# Patient Record
Sex: FEMALE | Race: WHITE | NOT HISPANIC OR LATINO | Employment: OTHER | URBAN - METROPOLITAN AREA
[De-identification: names, ages, dates, MRNs, and addresses within clinical notes are randomized per-mention and may not be internally consistent; named-entity substitution may affect disease eponyms.]

---

## 2017-01-12 ENCOUNTER — COMMUNICATION - HEALTHEAST (OUTPATIENT)
Dept: FAMILY MEDICINE | Facility: CLINIC | Age: 52
End: 2017-01-12

## 2017-01-23 ENCOUNTER — OFFICE VISIT - HEALTHEAST (OUTPATIENT)
Dept: FAMILY MEDICINE | Facility: CLINIC | Age: 52
End: 2017-01-23

## 2017-01-23 ENCOUNTER — COMMUNICATION - HEALTHEAST (OUTPATIENT)
Dept: TELEHEALTH | Facility: CLINIC | Age: 52
End: 2017-01-23

## 2017-01-23 DIAGNOSIS — Z82.49 FAMILY HISTORY OF HEART DISEASE: ICD-10-CM

## 2017-01-23 DIAGNOSIS — E78.5 HYPERLIPIDEMIA, UNSPECIFIED HYPERLIPIDEMIA TYPE: ICD-10-CM

## 2017-01-23 DIAGNOSIS — F40.240 CLAUSTROPHOBIA: ICD-10-CM

## 2017-01-23 DIAGNOSIS — Z12.4 SCREENING FOR CERVICAL CANCER: ICD-10-CM

## 2017-01-23 DIAGNOSIS — Z00.00 ROUTINE GENERAL MEDICAL EXAMINATION AT A HEALTH CARE FACILITY: ICD-10-CM

## 2017-01-23 DIAGNOSIS — G43.909 MIGRAINE HEADACHE: ICD-10-CM

## 2017-01-23 LAB
CHOLEST SERPL-MCNC: 265 MG/DL
FASTING STATUS PATIENT QL REPORTED: YES
HDLC SERPL-MCNC: 70 MG/DL
LDLC SERPL CALC-MCNC: 173 MG/DL
TRIGL SERPL-MCNC: 110 MG/DL

## 2017-01-23 ASSESSMENT — MIFFLIN-ST. JEOR: SCORE: 1214.6

## 2017-01-26 LAB
HPV INTERPRETATION - HISTORICAL: NORMAL
HPV INTERPRETER - HISTORICAL: NORMAL

## 2017-01-31 ENCOUNTER — COMMUNICATION - HEALTHEAST (OUTPATIENT)
Dept: FAMILY MEDICINE | Facility: CLINIC | Age: 52
End: 2017-01-31

## 2017-01-31 DIAGNOSIS — E78.5 HYPERLIPIDEMIA: ICD-10-CM

## 2017-01-31 DIAGNOSIS — R07.89 CHEST TIGHTNESS: ICD-10-CM

## 2017-02-03 ENCOUNTER — COMMUNICATION - HEALTHEAST (OUTPATIENT)
Dept: FAMILY MEDICINE | Facility: CLINIC | Age: 52
End: 2017-02-03

## 2017-02-15 ENCOUNTER — HOSPITAL ENCOUNTER (OUTPATIENT)
Dept: CARDIOLOGY | Facility: CLINIC | Age: 52
Discharge: HOME | End: 2017-02-15
Attending: FAMILY MEDICINE

## 2017-02-15 ENCOUNTER — HOSPITAL ENCOUNTER (OUTPATIENT)
Dept: MAMMOGRAPHY | Facility: CLINIC | Age: 52
Discharge: HOME | End: 2017-02-15
Attending: FAMILY MEDICINE

## 2017-02-15 DIAGNOSIS — R07.89 CHEST TIGHTNESS: ICD-10-CM

## 2017-02-15 DIAGNOSIS — Z12.31 VISIT FOR SCREENING MAMMOGRAM: ICD-10-CM

## 2017-02-15 LAB
CV STRESS CURRENT BP HE: NORMAL
CV STRESS CURRENT HR HE: 100
CV STRESS CURRENT HR HE: 101
CV STRESS CURRENT HR HE: 103
CV STRESS CURRENT HR HE: 103
CV STRESS CURRENT HR HE: 104
CV STRESS CURRENT HR HE: 105
CV STRESS CURRENT HR HE: 109
CV STRESS CURRENT HR HE: 110
CV STRESS CURRENT HR HE: 112
CV STRESS CURRENT HR HE: 115
CV STRESS CURRENT HR HE: 116
CV STRESS CURRENT HR HE: 119
CV STRESS CURRENT HR HE: 121
CV STRESS CURRENT HR HE: 126
CV STRESS CURRENT HR HE: 128
CV STRESS CURRENT HR HE: 133
CV STRESS CURRENT HR HE: 134
CV STRESS CURRENT HR HE: 135
CV STRESS CURRENT HR HE: 141
CV STRESS CURRENT HR HE: 142
CV STRESS CURRENT HR HE: 146
CV STRESS CURRENT HR HE: 154
CV STRESS CURRENT HR HE: 155
CV STRESS CURRENT HR HE: 157
CV STRESS CURRENT HR HE: 159
CV STRESS CURRENT HR HE: 159
CV STRESS CURRENT HR HE: 171
CV STRESS CURRENT HR HE: 174
CV STRESS CURRENT HR HE: 174
CV STRESS CURRENT HR HE: 176
CV STRESS CURRENT HR HE: 176
CV STRESS CURRENT HR HE: 74
CV STRESS CURRENT HR HE: 76
CV STRESS DEVIATION TIME HE: NORMAL
CV STRESS ECHO PERCENT HR HE: NORMAL
CV STRESS EXERCISE STAGE HE: NORMAL
CV STRESS FINAL RESTING BP HE: NORMAL
CV STRESS FINAL RESTING HR HE: 100
CV STRESS MAX HR HE: 177
CV STRESS MAX TREADMILL GRADE HE: 18
CV STRESS MAX TREADMILL SPEED HE: 5
CV STRESS PEAK DIA BP HE: NORMAL
CV STRESS PEAK SYS BP HE: NORMAL
CV STRESS PHASE HE: NORMAL
CV STRESS PROTOCOL HE: NORMAL
CV STRESS RESTING PT POSITION HE: NORMAL
CV STRESS RESTING PT POSITION HE: NORMAL
CV STRESS ST DEVIATION AMOUNT HE: NORMAL
CV STRESS ST DEVIATION ELEVATION HE: NORMAL
CV STRESS ST EVELATION AMOUNT HE: NORMAL
CV STRESS TEST TYPE HE: NORMAL
CV STRESS TOTAL STAGE TIME MIN 1 HE: NORMAL
STRESS ECHO BASELINE BP: NORMAL
STRESS ECHO BASELINE HR: 77
STRESS ECHO CALCULATED PERCENT HR: 104 %
STRESS ECHO LAST STRESS BP: NORMAL
STRESS ECHO LAST STRESS HR: 176
STRESS ECHO POST ESTIMATED WORKLOAD: 14.1
STRESS ECHO POST EXERCISE DUR MIN: 13
STRESS ECHO POST EXERCISE DUR SEC: 30
STRESS ECHO TARGET HR: 145

## 2017-02-16 ENCOUNTER — COMMUNICATION - HEALTHEAST (OUTPATIENT)
Dept: FAMILY MEDICINE | Facility: CLINIC | Age: 52
End: 2017-02-16

## 2017-08-29 ENCOUNTER — RECORDS - HEALTHEAST (OUTPATIENT)
Dept: ADMINISTRATIVE | Facility: OTHER | Age: 52
End: 2017-08-29

## 2018-02-06 ENCOUNTER — COMMUNICATION - HEALTHEAST (OUTPATIENT)
Dept: FAMILY MEDICINE | Facility: CLINIC | Age: 53
End: 2018-02-06

## 2018-03-19 ENCOUNTER — OFFICE VISIT - HEALTHEAST (OUTPATIENT)
Dept: FAMILY MEDICINE | Facility: CLINIC | Age: 53
End: 2018-03-19

## 2018-03-19 ENCOUNTER — COMMUNICATION - HEALTHEAST (OUTPATIENT)
Dept: FAMILY MEDICINE | Facility: CLINIC | Age: 53
End: 2018-03-19

## 2018-03-19 DIAGNOSIS — Z12.31 VISIT FOR SCREENING MAMMOGRAM: ICD-10-CM

## 2018-03-19 DIAGNOSIS — F33.2 SEVERE EPISODE OF RECURRENT MAJOR DEPRESSIVE DISORDER, WITHOUT PSYCHOTIC FEATURES (H): ICD-10-CM

## 2018-03-19 DIAGNOSIS — F41.1 GENERALIZED ANXIETY DISORDER: ICD-10-CM

## 2018-03-19 DIAGNOSIS — R63.4 WEIGHT LOSS: ICD-10-CM

## 2018-03-19 LAB — TSH SERPL DL<=0.005 MIU/L-ACNC: 1.08 UIU/ML (ref 0.3–5)

## 2018-03-19 ASSESSMENT — MIFFLIN-ST. JEOR: SCORE: 1131.14

## 2018-03-28 ENCOUNTER — COMMUNICATION - HEALTHEAST (OUTPATIENT)
Dept: FAMILY MEDICINE | Facility: CLINIC | Age: 53
End: 2018-03-28

## 2018-04-02 ENCOUNTER — COMMUNICATION - HEALTHEAST (OUTPATIENT)
Dept: FAMILY MEDICINE | Facility: CLINIC | Age: 53
End: 2018-04-02

## 2018-04-02 ENCOUNTER — OFFICE VISIT - HEALTHEAST (OUTPATIENT)
Dept: FAMILY MEDICINE | Facility: CLINIC | Age: 53
End: 2018-04-02

## 2018-04-02 DIAGNOSIS — F33.2 SEVERE EPISODE OF RECURRENT MAJOR DEPRESSIVE DISORDER, WITHOUT PSYCHOTIC FEATURES (H): ICD-10-CM

## 2018-04-02 DIAGNOSIS — F41.1 GENERALIZED ANXIETY DISORDER: ICD-10-CM

## 2018-04-16 ENCOUNTER — OFFICE VISIT - HEALTHEAST (OUTPATIENT)
Dept: FAMILY MEDICINE | Facility: CLINIC | Age: 53
End: 2018-04-16

## 2018-04-16 DIAGNOSIS — F41.1 GENERALIZED ANXIETY DISORDER: ICD-10-CM

## 2018-04-16 DIAGNOSIS — F33.2 SEVERE EPISODE OF RECURRENT MAJOR DEPRESSIVE DISORDER, WITHOUT PSYCHOTIC FEATURES (H): ICD-10-CM

## 2018-04-30 ENCOUNTER — OFFICE VISIT - HEALTHEAST (OUTPATIENT)
Dept: FAMILY MEDICINE | Facility: CLINIC | Age: 53
End: 2018-04-30

## 2018-04-30 DIAGNOSIS — F33.2 SEVERE EPISODE OF RECURRENT MAJOR DEPRESSIVE DISORDER, WITHOUT PSYCHOTIC FEATURES (H): ICD-10-CM

## 2018-04-30 DIAGNOSIS — F41.1 GENERALIZED ANXIETY DISORDER: ICD-10-CM

## 2018-04-30 ASSESSMENT — MIFFLIN-ST. JEOR: SCORE: 1086.23

## 2018-05-06 ENCOUNTER — COMMUNICATION - HEALTHEAST (OUTPATIENT)
Dept: FAMILY MEDICINE | Facility: CLINIC | Age: 53
End: 2018-05-06

## 2018-06-05 ENCOUNTER — COMMUNICATION - HEALTHEAST (OUTPATIENT)
Dept: FAMILY MEDICINE | Facility: CLINIC | Age: 53
End: 2018-06-05

## 2018-06-05 DIAGNOSIS — G43.909 MIGRAINE HEADACHE: ICD-10-CM

## 2018-10-04 ENCOUNTER — AMBULATORY - HEALTHEAST (OUTPATIENT)
Dept: NURSING | Facility: CLINIC | Age: 53
End: 2018-10-04

## 2018-10-04 ENCOUNTER — COMMUNICATION - HEALTHEAST (OUTPATIENT)
Dept: SCHEDULING | Facility: CLINIC | Age: 53
End: 2018-10-04

## 2018-10-04 DIAGNOSIS — Z23 NEED FOR VACCINATION: ICD-10-CM

## 2018-11-07 ENCOUNTER — COMMUNICATION - HEALTHEAST (OUTPATIENT)
Dept: FAMILY MEDICINE | Facility: CLINIC | Age: 53
End: 2018-11-07

## 2018-11-30 ENCOUNTER — COMMUNICATION - HEALTHEAST (OUTPATIENT)
Dept: FAMILY MEDICINE | Facility: CLINIC | Age: 53
End: 2018-11-30

## 2019-01-22 ENCOUNTER — COMMUNICATION - HEALTHEAST (OUTPATIENT)
Dept: FAMILY MEDICINE | Facility: CLINIC | Age: 54
End: 2019-01-22

## 2019-02-09 ENCOUNTER — COMMUNICATION - HEALTHEAST (OUTPATIENT)
Dept: FAMILY MEDICINE | Facility: CLINIC | Age: 54
End: 2019-02-09

## 2019-04-16 ENCOUNTER — COMMUNICATION - HEALTHEAST (OUTPATIENT)
Dept: FAMILY MEDICINE | Facility: CLINIC | Age: 54
End: 2019-04-16

## 2019-04-16 DIAGNOSIS — F41.1 GENERALIZED ANXIETY DISORDER: ICD-10-CM

## 2019-04-18 RX ORDER — ALPRAZOLAM 0.5 MG
TABLET ORAL
Qty: 20 TABLET | Refills: 0 | Status: SHIPPED | OUTPATIENT
Start: 2019-04-18

## 2019-05-09 ENCOUNTER — COMMUNICATION - HEALTHEAST (OUTPATIENT)
Dept: FAMILY MEDICINE | Facility: CLINIC | Age: 54
End: 2019-05-09

## 2019-05-09 DIAGNOSIS — F41.1 GENERALIZED ANXIETY DISORDER: ICD-10-CM

## 2019-05-09 DIAGNOSIS — F33.2 SEVERE EPISODE OF RECURRENT MAJOR DEPRESSIVE DISORDER, WITHOUT PSYCHOTIC FEATURES (H): ICD-10-CM

## 2019-05-09 DIAGNOSIS — G43.809 OTHER MIGRAINE WITHOUT STATUS MIGRAINOSUS, NOT INTRACTABLE: ICD-10-CM

## 2019-07-29 ENCOUNTER — OFFICE VISIT - HEALTHEAST (OUTPATIENT)
Dept: FAMILY MEDICINE | Facility: CLINIC | Age: 54
End: 2019-07-29

## 2019-07-29 DIAGNOSIS — B07.8 COMMON WART: ICD-10-CM

## 2019-07-29 DIAGNOSIS — F41.1 GENERALIZED ANXIETY DISORDER: ICD-10-CM

## 2019-07-29 DIAGNOSIS — Z00.00 ROUTINE GENERAL MEDICAL EXAMINATION AT A HEALTH CARE FACILITY: ICD-10-CM

## 2019-07-29 DIAGNOSIS — E78.5 HYPERLIPIDEMIA, UNSPECIFIED HYPERLIPIDEMIA TYPE: ICD-10-CM

## 2019-07-29 LAB
ANION GAP SERPL CALCULATED.3IONS-SCNC: 13 MMOL/L (ref 5–18)
BUN SERPL-MCNC: 12 MG/DL (ref 8–22)
CALCIUM SERPL-MCNC: 10 MG/DL (ref 8.5–10.5)
CHLORIDE BLD-SCNC: 104 MMOL/L (ref 98–107)
CHOLEST SERPL-MCNC: 287 MG/DL
CO2 SERPL-SCNC: 23 MMOL/L (ref 22–31)
CREAT SERPL-MCNC: 0.85 MG/DL (ref 0.6–1.1)
FASTING STATUS PATIENT QL REPORTED: ABNORMAL
GFR SERPL CREATININE-BSD FRML MDRD: >60 ML/MIN/1.73M2
GLUCOSE BLD-MCNC: 75 MG/DL (ref 70–125)
HDLC SERPL-MCNC: 88 MG/DL
LDLC SERPL CALC-MCNC: 186 MG/DL
POTASSIUM BLD-SCNC: 4.6 MMOL/L (ref 3.5–5)
SODIUM SERPL-SCNC: 140 MMOL/L (ref 136–145)
TRIGL SERPL-MCNC: 65 MG/DL
TSH SERPL DL<=0.005 MIU/L-ACNC: 0.72 UIU/ML (ref 0.3–5)

## 2019-07-29 ASSESSMENT — MIFFLIN-ST. JEOR: SCORE: 1186.02

## 2019-08-06 ENCOUNTER — AMBULATORY - HEALTHEAST (OUTPATIENT)
Dept: FAMILY MEDICINE | Facility: CLINIC | Age: 54
End: 2019-08-06

## 2019-08-06 DIAGNOSIS — E78.00 PURE HYPERCHOLESTEROLEMIA: ICD-10-CM

## 2019-08-08 ENCOUNTER — COMMUNICATION - HEALTHEAST (OUTPATIENT)
Dept: TELEHEALTH | Facility: CLINIC | Age: 54
End: 2019-08-08

## 2019-08-08 ENCOUNTER — HOSPITAL ENCOUNTER (OUTPATIENT)
Dept: CT IMAGING | Facility: CLINIC | Age: 54
Discharge: HOME | End: 2019-08-08
Attending: FAMILY MEDICINE

## 2019-08-08 DIAGNOSIS — E78.00 PURE HYPERCHOLESTEROLEMIA: ICD-10-CM

## 2019-08-08 LAB
CV CALCIUM SCORE AGATSTON LM: 0
CV CALCIUM SCORING AGATSON LAD: 0
CV CALCIUM SCORING AGATSTON CX: 0
CV CALCIUM SCORING AGATSTON RCA: 0
CV CALCIUM SCORING AGATSTON TOTAL: 0

## 2020-02-21 ENCOUNTER — COMMUNICATION - HEALTHEAST (OUTPATIENT)
Dept: FAMILY MEDICINE | Facility: CLINIC | Age: 55
End: 2020-02-21

## 2020-02-21 DIAGNOSIS — F41.1 GENERALIZED ANXIETY DISORDER: ICD-10-CM

## 2020-05-08 ENCOUNTER — COMMUNICATION - HEALTHEAST (OUTPATIENT)
Dept: FAMILY MEDICINE | Facility: CLINIC | Age: 55
End: 2020-05-08

## 2020-05-08 DIAGNOSIS — G43.909 MIGRAINE HEADACHE: ICD-10-CM

## 2020-06-16 ENCOUNTER — COMMUNICATION - HEALTHEAST (OUTPATIENT)
Dept: FAMILY MEDICINE | Facility: CLINIC | Age: 55
End: 2020-06-16

## 2020-06-16 DIAGNOSIS — G43.909 MIGRAINE HEADACHE: ICD-10-CM

## 2020-07-06 ENCOUNTER — COMMUNICATION - HEALTHEAST (OUTPATIENT)
Dept: SCHEDULING | Facility: CLINIC | Age: 55
End: 2020-07-06

## 2020-07-06 DIAGNOSIS — R30.0 DYSURIA: ICD-10-CM

## 2020-08-09 ENCOUNTER — COMMUNICATION - HEALTHEAST (OUTPATIENT)
Dept: FAMILY MEDICINE | Facility: CLINIC | Age: 55
End: 2020-08-09

## 2020-08-09 DIAGNOSIS — F41.1 GENERALIZED ANXIETY DISORDER: ICD-10-CM

## 2020-09-02 ENCOUNTER — COMMUNICATION - HEALTHEAST (OUTPATIENT)
Dept: FAMILY MEDICINE | Facility: CLINIC | Age: 55
End: 2020-09-02

## 2020-09-02 DIAGNOSIS — F41.1 GENERALIZED ANXIETY DISORDER: ICD-10-CM

## 2020-09-02 DIAGNOSIS — G43.909 MIGRAINE HEADACHE: ICD-10-CM

## 2020-10-08 ENCOUNTER — RECORDS - HEALTHEAST (OUTPATIENT)
Dept: ADMINISTRATIVE | Facility: OTHER | Age: 55
End: 2020-10-08

## 2020-11-30 ENCOUNTER — OFFICE VISIT - HEALTHEAST (OUTPATIENT)
Dept: FAMILY MEDICINE | Facility: CLINIC | Age: 55
End: 2020-11-30

## 2020-11-30 ENCOUNTER — COMMUNICATION - HEALTHEAST (OUTPATIENT)
Dept: FAMILY MEDICINE | Facility: CLINIC | Age: 55
End: 2020-11-30

## 2020-11-30 ENCOUNTER — HOSPITAL ENCOUNTER (OUTPATIENT)
Dept: MAMMOGRAPHY | Facility: CLINIC | Age: 55
Discharge: HOME | End: 2020-11-30
Attending: FAMILY MEDICINE

## 2020-11-30 DIAGNOSIS — G43.809 OTHER MIGRAINE WITHOUT STATUS MIGRAINOSUS, NOT INTRACTABLE: ICD-10-CM

## 2020-11-30 DIAGNOSIS — F41.1 GENERALIZED ANXIETY DISORDER: ICD-10-CM

## 2020-11-30 DIAGNOSIS — Z00.00 ROUTINE GENERAL MEDICAL EXAMINATION AT A HEALTH CARE FACILITY: ICD-10-CM

## 2020-11-30 DIAGNOSIS — E78.5 HYPERLIPIDEMIA, UNSPECIFIED HYPERLIPIDEMIA TYPE: ICD-10-CM

## 2020-11-30 DIAGNOSIS — Z12.31 VISIT FOR SCREENING MAMMOGRAM: ICD-10-CM

## 2020-11-30 LAB
CHOLEST SERPL-MCNC: 266 MG/DL
FASTING STATUS PATIENT QL REPORTED: YES
HDLC SERPL-MCNC: 80 MG/DL
LDLC SERPL CALC-MCNC: 169 MG/DL
TRIGL SERPL-MCNC: 83 MG/DL

## 2020-11-30 ASSESSMENT — ANXIETY QUESTIONNAIRES
6. BECOMING EASILY ANNOYED OR IRRITABLE: NOT AT ALL
1. FEELING NERVOUS, ANXIOUS, OR ON EDGE: NOT AT ALL
5. BEING SO RESTLESS THAT IT IS HARD TO SIT STILL: NOT AT ALL
2. NOT BEING ABLE TO STOP OR CONTROL WORRYING: NOT AT ALL
IF YOU CHECKED OFF ANY PROBLEMS ON THIS QUESTIONNAIRE, HOW DIFFICULT HAVE THESE PROBLEMS MADE IT FOR YOU TO DO YOUR WORK, TAKE CARE OF THINGS AT HOME, OR GET ALONG WITH OTHER PEOPLE: NOT DIFFICULT AT ALL
GAD7 TOTAL SCORE: 0
7. FEELING AFRAID AS IF SOMETHING AWFUL MIGHT HAPPEN: NOT AT ALL
3. WORRYING TOO MUCH ABOUT DIFFERENT THINGS: NOT AT ALL
4. TROUBLE RELAXING: NOT AT ALL

## 2020-11-30 ASSESSMENT — MIFFLIN-ST. JEOR: SCORE: 1231.95

## 2020-11-30 NOTE — ASSESSMENT & PLAN NOTE
The patient had a CT heart scan last year due to hyperlipidemia and family history for cardiovascular disease.  The CT scan came backwith a score of 0.  Continue to monitor.

## 2021-01-04 ENCOUNTER — HEALTH MAINTENANCE LETTER (OUTPATIENT)
Age: 56
End: 2021-01-04

## 2021-02-03 ENCOUNTER — COMMUNICATION - HEALTHEAST (OUTPATIENT)
Dept: FAMILY MEDICINE | Facility: CLINIC | Age: 56
End: 2021-02-03

## 2021-02-03 DIAGNOSIS — F41.1 GENERALIZED ANXIETY DISORDER: ICD-10-CM

## 2021-02-03 RX ORDER — FLUOXETINE 10 MG/1
CAPSULE ORAL
Qty: 180 CAPSULE | Refills: 3 | Status: SHIPPED | OUTPATIENT
Start: 2021-02-03 | End: 2021-12-20

## 2021-05-27 NOTE — TELEPHONE ENCOUNTER
Controlled Substance Refill Request  Medication:   Requested Prescriptions     Pending Prescriptions Disp Refills     ALPRAZolam (XANAX) 0.5 MG tablet [Pharmacy Med Name: ALPRAZOLAM 0.5 MG TABLET] 20 tablet 0     Sig: TAKE 1 TABLET BY MOUTH THREE TIMES A DAY AS NEEDED FOR ANXIETY OR SLEEP     Date Last Fill: 2/13/19  [id number]   Submit electronically to pharmacy  Controlled Substance Agreement on File:   Encounter-Level CSA Scan Date:    There are no encounter-level csa scan date.       Last office visit: Last office visit pertaining to requested medication was 4/30/18.

## 2021-05-28 ENCOUNTER — RECORDS - HEALTHEAST (OUTPATIENT)
Dept: ADMINISTRATIVE | Facility: CLINIC | Age: 56
End: 2021-05-28

## 2021-05-28 ENCOUNTER — RECORDS - HEALTHEAST (OUTPATIENT)
Dept: FAMILY MEDICINE | Facility: CLINIC | Age: 56
End: 2021-05-28

## 2021-05-28 ASSESSMENT — ANXIETY QUESTIONNAIRES: GAD7 TOTAL SCORE: 0

## 2021-05-28 NOTE — TELEPHONE ENCOUNTER
RN cannot approve Refill Request    RN can NOT refill this medication PCP messaged that patient is overdue for Office Visit. Last office visit: 4/30/2018 Tiny Ballesteros MD Last Physical: 1/23/2017 Last MTM visit: Visit date not found Last visit same specialty: 4/30/2018 Tiny Ballesteros MD.  Next visit within 3 mo: Visit date not found  Next physical within 3 mo: Visit date not found      Lou Vo, [facility] Triage/Med Refill 5/9/2019    Requested Prescriptions   Pending Prescriptions Disp Refills     FLUoxetine (PROZAC) 10 MG capsule [Pharmacy Med Name: FLUOXETINE HCL CAPS 10MG] 270 capsule 3     Sig: TAKE 3 CAPSULES DAILY       [facility] Refill Protocol  Failed - 5/9/2019  1:18 PM        Failed - PCP or prescribing provider visit in last year     Last office visit with prescriber/PCP: 4/30/2018 Tiny Ballesteros MD OR same dept: Visit date not found OR same specialty: 4/30/2018 Tiny Ballesteros MD  Last physical: 1/23/2017 Last MTM visit: Visit date not found   Next visit within 3 mo: Visit date not found  Next physical within 3 mo: Visit date not found  Prescriber OR PCP: Tiny Ballesteros MD  Last diagnosis associated with med order: There are no diagnoses linked to this encounter.  If protocol passes may refill for 12 months if within 3 months of last provider visit (or a total of 15 months).             SUMAtriptan (IMITREX) 50 MG tablet [Pharmacy Med Name: SUMATRIPTAN TABS 9'S 50MG] 27 tablet 3     Sig: TAKE 1 TABLET EVERY 2 HOURS AS NEEDED FOR MIGRAINE       Triptans Refill Protocol Failed - 5/9/2019  1:18 PM        Failed - PCP or prescribing provider visit in past 12 months       Last office visit with prescriber/PCP: 4/30/2018 Tiny Ballesteros MD OR same dept: Visit date not found OR same specialty: 4/30/2018 Tiny Ballesteros MD  Last physical: 1/23/2017 Last MTM visit: Visit date not found   Next visit within 3 mo: Visit date not found  Next physical within 3 mo: Visit  date not found  Prescriber OR PCP: Tiny Ballesteros MD  Last diagnosis associated with med order: There are no diagnoses linked to this encounter.  If protocol passes may refill for 12 months if within 3 months of last provider visit (or a total of 15 months).

## 2021-05-30 VITALS — WEIGHT: 142.3 LBS | HEIGHT: 63 IN | BODY MASS INDEX: 25.21 KG/M2

## 2021-05-30 NOTE — PROGRESS NOTES
Assessment/Plan:      Healthy female exam.   1. Routine general medical examination at a health care facility  The patient is quite overdue for mammogram and has that scheduled for sometime next month.  Her colonoscopy and Pap smear are up-to-date.  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  Her immunizations are up-to-date and she does supplement vitamin D on a regular basis.    2. Generalized anxiety disorder  The patient still at times has some flight anxiety and would like to have access to Xanax to take as needed for this indication only.  She feels she is doing well on fluoxetine 10 mg daily and continues on that medication.  - FLUoxetine (PROZAC) 10 MG capsule; Take 1 capsule (10 mg total) by mouth daily.  Dispense: 90 capsule; Refill: 3  - Basic Metabolic Panel    3. Hyperlipidemia, unspecified hyperlipidemia type  Her LDL cholesterol has been quite high in the past and she anticipates does need to have a conversation about this once the result comes back.  - Lipid Profile  - Thyroid Cascade    4. Common wart  Treated with both liquid nitrogen and then an application of cantharidin today recommended reevaluation and treatment in 2 to 3 weeks.           Subjective:      Evangelina Boggs is a 54 y.o. female who presents for an annual exam.  The patient is doing very well overall without significant complaints or concerns.  However, she has had a wart on her right hand that she has been trying to rice for the past year without success and in fact feels that her interventions have caused it to actually get bigger.      Healthy Habits:   Regular Exercise: Yes; 2-3 days per week yoga and walking  Sunscreen Use: Yes  Healthy Diet: Yes  Dental Visits Regularly: Yes  Seat Belt: Yes  Sexually active: Yes  Self Breast Exam Monthly:Yes  Colonoscopy: 2017  Lipid Profile: Yes  Glucose Screen: N/A  Prevention of  Osteoporosis: Yes  Last Dexa: N/A        Immunization History   Administered Date(s) Administered     DT (pediatric) 1999     Influenza, Seasonal, Inj PF IIV3 2010     Influenza, seasonal,quad inj 6-35 mos 2012     Td, adult adsorbed, PF 10/04/2018     Td,adult,historic,unspecified 06/15/2006     Tdap 2010     Immunization status: up to date and documented.    Gynecologic History  No LMP recorded.  Contraception: vasectomy  Last Pap: . Results were: normal  Last mammogram: . Results were: normal      OB History    Para Term  AB Living   2 2 2         SAB TAB Ectopic Multiple Live Births                  # Outcome Date GA Lbr Tyler/2nd Weight Sex Delivery Anes PTL Lv   2 Term            1 Term                Current Outpatient Medications   Medication Sig Dispense Refill     ALPRAZolam (XANAX) 0.5 MG tablet TAKE 1 TABLET BY MOUTH THREE TIMES A DAY AS NEEDED FOR ANXIETY OR SLEEP 20 tablet 0     FLUoxetine (PROZAC) 10 MG capsule Take 1 capsule (10 mg total) by mouth daily. 90 capsule 3     SUMAtriptan (IMITREX) 50 MG tablet TAKE 1 TABLET EVERY 2 HOURS AS NEEDED FOR MIGRAINE 27 tablet 3     No current facility-administered medications for this visit.      Past Medical History:   Diagnosis Date     High cholesterol      Past Surgical History:   Procedure Laterality Date     BREAST BIOPSY Left      OK  DELIVERY ONLY      Description:  Section;  Recorded: 2008;  Comments: two children     OK INCISE FINGER TENDON SHEATH      Description: Hand Incision Tendon Sheath Of A Finger;  Recorded: 2011;     Patient has no known allergies.  Family History   Problem Relation Age of Onset     Atrial fibrillation Mother      Graves' disease Mother      Heart disease Father 70        bypass grafting     Heart disease Paternal Uncle 52        MI and stents     Stroke Maternal Grandmother      Stroke Maternal Grandfather      Heart disease Paternal Grandfather 36         coronary arteriosclerosis     Carotid Endarterectomy Paternal Uncle      Stroke Paternal Uncle      Social History     Socioeconomic History     Marital status:      Spouse name: Not on file     Number of children: Not on file     Years of education: Not on file     Highest education level: Not on file   Occupational History     Not on file   Social Needs     Financial resource strain: Not on file     Food insecurity:     Worry: Not on file     Inability: Not on file     Transportation needs:     Medical: Not on file     Non-medical: Not on file   Tobacco Use     Smoking status: Never Smoker     Smokeless tobacco: Never Used   Substance and Sexual Activity     Alcohol use: Not on file     Drug use: Not on file     Sexual activity: Not on file   Lifestyle     Physical activity:     Days per week: Not on file     Minutes per session: Not on file     Stress: Not on file   Relationships     Social connections:     Talks on phone: Not on file     Gets together: Not on file     Attends Mosque service: Not on file     Active member of club or organization: Not on file     Attends meetings of clubs or organizations: Not on file     Relationship status: Not on file     Intimate partner violence:     Fear of current or ex partner: Not on file     Emotionally abused: Not on file     Physically abused: Not on file     Forced sexual activity: Not on file   Other Topics Concern     Not on file   Social History Narrative     Not on file       Review of Systems  General:  Denies problem  Eyes: Denies problem  Ears/Nose/Throat: Denies problem  Cardiovascular: Denies problem  Respiratory:  Denies problem  Gastrointestinal:  Denies problem, Genitourinary: Denies problem  Musculoskeletal:  Denies problem  Skin: Denies problem  Neurologic: Denies problem  Psychiatric: Denies problem  Endocrine: Denies problem  Heme/Lymphatic: Denies problem   Allergic/Immunologic: Denies problem        Objective:         Vitals:     "07/29/19 1305   BP: 100/64   Pulse: 72   Weight: 136 lb (61.7 kg)   Height: 5' 3\" (1.6 m)     Body mass index is 24.09 kg/m .    Physical Exam:  General Appearance: Alert, cooperative, no distress, appears stated age  Head: Normocephalic, without obvious abnormality, atraumatic  Eyes: PERRL, conjunctiva/corneas clear, EOM's intact  Ears: Normal TM's and external ear canals, both ears  Nose: Nares normal, septum midline,mucosa normal, no drainage  Throat: Lips, mucosa, and tongue normal; teeth and gums normal  Neck: Supple, symmetrical, trachea midline, no adenopathy;  thyroid: not enlarged, symmetric, no tenderness/mass/nodules; no carotid bruit or JVD  Back: Symmetric, no curvature, ROM normal, no CVA tenderness  Lungs: Clear to auscultation bilaterally, respirations unlabored  Breasts: No breast masses, tenderness, asymmetry, or nipple discharge.  Heart: Regular rate and rhythm, S1 and S2 normal, no murmur, rub, or gallop, Abdomen: Soft, non-tender, bowel sounds active all four quadrants,  no masses, no organomegaly  Pelvic:Not examined  Extremities: Extremities normal, atraumatic, no cyanosis or edema  Skin: Skin color, texture, turgor normal, no rashes or lesions  Lymph nodes: Cervical, supraclavicular, and axillary nodes normal  Neurologic: Normal        "

## 2021-05-31 ENCOUNTER — RECORDS - HEALTHEAST (OUTPATIENT)
Dept: ADMINISTRATIVE | Facility: CLINIC | Age: 56
End: 2021-05-31

## 2021-06-01 ENCOUNTER — COMMUNICATION - HEALTHEAST (OUTPATIENT)
Dept: FAMILY MEDICINE | Facility: CLINIC | Age: 56
End: 2021-06-01

## 2021-06-01 VITALS — BODY MASS INDEX: 21.95 KG/M2 | HEIGHT: 63 IN | WEIGHT: 123.9 LBS

## 2021-06-01 VITALS — BODY MASS INDEX: 20.9 KG/M2 | WEIGHT: 118 LBS

## 2021-06-01 VITALS — WEIGHT: 118 LBS | BODY MASS INDEX: 20.9 KG/M2

## 2021-06-01 VITALS — BODY MASS INDEX: 20.2 KG/M2 | HEIGHT: 63 IN | WEIGHT: 114 LBS

## 2021-06-01 DIAGNOSIS — G43.909 MIGRAINE HEADACHE: ICD-10-CM

## 2021-06-02 RX ORDER — SUMATRIPTAN 50 MG/1
TABLET, FILM COATED ORAL
Qty: 27 TABLET | Refills: 2 | Status: SHIPPED | OUTPATIENT
Start: 2021-06-02 | End: 2021-12-20

## 2021-06-03 VITALS — WEIGHT: 136 LBS | HEIGHT: 63 IN | BODY MASS INDEX: 24.1 KG/M2

## 2021-06-05 VITALS
HEART RATE: 79 BPM | DIASTOLIC BLOOD PRESSURE: 64 MMHG | OXYGEN SATURATION: 99 % | WEIGHT: 147 LBS | BODY MASS INDEX: 26.05 KG/M2 | HEIGHT: 63 IN | SYSTOLIC BLOOD PRESSURE: 100 MMHG

## 2021-06-06 NOTE — TELEPHONE ENCOUNTER
Refill Request  Did you contact pharmacy: No  Medication name:   Requested Prescriptions     Pending Prescriptions Disp Refills     FLUoxetine (PROZAC) 10 MG capsule 90 capsule 3     Sig: Take 1 capsule (10 mg total) by mouth daily.     Who prescribed the medication: Tiny Ballesteros MD   Requested Pharmacy: [facility] # [id number]  in [place], [place]  Is patient out of medication: No.  12  days left  Patient notified refills processed in 3 business days:  yes  Okay to leave a detailed message: yes  [phone]

## 2021-06-08 NOTE — TELEPHONE ENCOUNTER
RN cannot approve Refill Request    RN can NOT refill this medication PCP to review. Last office visit: 4/30/2018 Tiny Ballesteros MD Last Physical: 7/29/2019 Last MTM visit: Visit date not found Last visit same specialty: 4/30/2018 Tiny Ballesteros MD.  Next visit within 3 mo: Visit date not found  Next physical within 3 mo: Visit date not found      Rossi King, [facility] Connection Triage/Med Refill 6/17/2020    Requested Prescriptions   Pending Prescriptions Disp Refills     SUMAtriptan (IMITREX) 50 MG tablet [Pharmacy Med Name: SUMATRIPTAN  50MG  TAB] 27 tablet 0     Sig: TAKE 1 TABLET EVERY 2 HOURS AS NEEDED FOR MIGRAINE.   RECOMMENDS NOT TO EXCEED 4 TABLETS IN 24  HOURS       Triptans Refill Protocol Passed - 6/16/2020  9:24 AM        Passed - PCP or prescribing provider visit in past 12 months       Last office visit with prescriber/PCP: 4/30/2018 Tiny Ballesteros MD OR same dept: Visit date not found OR same specialty: 4/30/2018 Tiny Ballesteros MD  Last physical: 7/29/2019 Last MTM visit: Visit date not found   Next visit within 3 mo: Visit date not found  Next physical within 3 mo: Visit date not found  Prescriber OR PCP: Tiny Ballesteros MD  Last diagnosis associated with med order: 1. Migraine headache  - SUMAtriptan (IMITREX) 50 MG tablet [Pharmacy Med Name: SUMATRIPTAN  50MG  TAB]; TAKE 1 TABLET EVERY 2 HOURS AS NEEDED FOR MIGRAINE.   RECOMMENDS NOT TO EXCEED 4 TABLETS IN 24  HOURS  Dispense: 27 tablet; Refill: 0    If protocol passes may refill for 12 months if within 3 months of last provider visit (or a total of 15 months).

## 2021-06-08 NOTE — TELEPHONE ENCOUNTER
Refill Approved    Rx renewed per Medication Renewal Policy. Medication was last renewed on 6/6/18.    Winifred Benites, [facility] [place] Connection Triage/Med Refill 5/11/2020     Requested Prescriptions   Pending Prescriptions Disp Refills     SUMAtriptan (IMITREX) 50 MG tablet 27 tablet 3       Triptans Refill Protocol Passed - 5/8/2020 12:05 PM        Passed - PCP or prescribing provider visit in past 12 months       Last office visit with prescriber/PCP: 4/30/2018 Tiny Ballesteros MD OR same dept: Visit date not found OR same specialty: 4/30/2018 Tiny Ballesteros MD  Last physical: 7/29/2019 Last MTM visit: Visit date not found   Next visit within 3 mo: Visit date not found  Next physical within 3 mo: Visit date not found  Prescriber OR PCP: Tiny Ballesteros MD  Last diagnosis associated with med order: 1. Migraine headache  - SUMAtriptan (IMITREX) 50 MG tablet  Dispense: 27 tablet; Refill: 3    If protocol passes may refill for 12 months if within 3 months of last provider visit (or a total of 15 months).

## 2021-06-08 NOTE — PROGRESS NOTES
Assessment/Plan:      Healthy female exam.   1. Migraine headache  - SUMAtriptan (IMITREX) 50 MG tablet; Take 1 tablet (50 mg total) by mouth every 2 (two) hours as needed for migraine.  Dispense: 30 tablet; Refill: 3    2. Hyperlipidemia, unspecified hyperlipidemia type  The patient's last LDL cholesterol was quite high and we did discuss medication especially considering her family history for heart disease.  However, the patient continues to be reticent to start a cholesterol medication without further evidence that she herself is affected.  We will recheck an LDL cholesterol today and I will get back to the patient for further conversation.  - Lipid Profile    3. Routine general medical examination at a health care facility  I placed an order for colonoscopy which she will be due for after turning 52 this May.  I encouraged the patient is scheduled for a mammogram and continue this annually through age 57.  Lastly, I updated the patient's Pap smear today.  The patient takes good care of herself with a healthy diet, exercise and maintaining a healthy weight.  She supplements vitamin D on a regular basis and is up-to-date on her immunizations.  - Thyroid Cascade  - HM2(CBC w/o Differential)  - Ambulatory referral for Colonoscopy    4. Screening for cervical cancer  - Gynecologic Cytology (PAP Smear)    5. Family history of heart disease  The patient does have a concerning family history for atherosclerotic vascular disease affecting young family members and multiple family members.  I reviewed her CT heart scan from 2 years ago which placed her at very low risk with a score 0.  I reviewed the patient's recent cholesterol numbers and again discussed the potential benefit of adding a statin medication.  However, the patient is reticent to start this at this time.  I rechecked an LDL cholesterol.  I will also do more research into being able to evaluate the intimal thickness of her carotid artery as another potential  marker.    6. Claustrophobia  The patient has recently developed significant claustrophobia in certain settings.  A prescription for Ativan to take sparingly as needed was provided today.         Subjective:      Evangelina Boggs is a 51 y.o. female who presents for an annual exam.  Patient comes in today with a couple of specific concerns.  First, the patient expresses significant concern about her risk of cardiovascular disease based on her strong family history.  Please refer to the detailed family history in that section of her medical record from today.  The patient does have a history of hyperlipidemia herself but otherwise no significant risk factors.  She is asymptomatic and exercises regularly with no symptoms of chest pain, palpitations or trouble breathing.  The patient also reports that she recently has developed significant claustrophobia symptoms and certain settings such as on a crowded airplane, at concerts and other places especially where there is a lot of people.  She wonders if there is anything she can take for this.    Healthy Habits:   Regular Exercise: Yes  Sunscreen Use: Yes  Healthy Diet: Yes  Dental Visits Regularly: Yes  Seat Belt: Yes  Sexually active: Yes  Self Breast Exam Monthly:Yes  Colonoscopy: order placed for after May  Lipid Profile: Yes  Glucose Screen: No  Prevention of Osteoporosis: Yes  Last Dexa: N/A      Immunization History   Administered Date(s) Administered     DT (pediatric) 01/01/1999     Influenza, Seasonal, Inj PF 11/02/2010     Influenza, seasonal,quad inj 6-35 mos 01/06/2012     Td, historic 06/15/2006     Tdap 11/02/2010     Immunization status: up to date and documented.    Gynecologic History  No LMP recorded.  Contraception: vasectomy  Last Pap: 2013. Results were: normal  Last mammogram: 2013. Results were: normal      OB History   No data available       Current Outpatient Prescriptions   Medication Sig Dispense Refill     LORazepam (ATIVAN) 0.5 MG tablet Take  1-2 tablets PO every 4-6 hours PRN 30 tablet 1     SUMAtriptan (IMITREX) 50 MG tablet TAKE 1 TABLET EVERY 2 HOURSAS NEEDED FOR MIGRAINE 18 tablet 1     SUMAtriptan (IMITREX) 50 MG tablet TAKE 1 TABLET EVERY 2 HOURSAS NEEDED FOR MIGRAINE 9 tablet 4     SUMAtriptan (IMITREX) 50 MG tablet Take 1 tablet (50 mg total) by mouth every 2 (two) hours as needed for migraine. 30 tablet 3     No current facility-administered medications for this visit.      No past medical history on file.  Past Surgical History   Procedure Laterality Date     Pr  delivery only       Description:  Section;  Recorded: 2008;  Comments: two children     Pr incise finger tendon sheath       Description: Hand Incision Tendon Sheath Of A Finger;  Recorded: 2011;     Review of patient's allergies indicates no known allergies.  Family History   Problem Relation Age of Onset     Atrial fibrillation Mother      Graves' disease Mother      Heart disease Father 70     bypass grafting     Heart disease Paternal Uncle 52     MI and stents     Stroke Maternal Grandmother      Stroke Maternal Grandfather      Heart disease Paternal Grandfather 36     coronary arteriosclerosis     Carotid Endarterectomy Paternal Uncle      Stroke Paternal Uncle      Social History     Social History     Marital status:      Spouse name: N/A     Number of children: N/A     Years of education: N/A     Occupational History     Not on file.     Social History Main Topics     Smoking status: Never Smoker     Smokeless tobacco: Never Used     Alcohol use Not on file     Drug use: Not on file     Sexual activity: Not on file     Other Topics Concern     Not on file     Social History Narrative       Review of Systems  General:  Denies problem  Eyes: Denies problem  Ears/Nose/Throat: Denies problem  Cardiovascular: Denies problem  Respiratory:  Denies problem  Gastrointestinal:  Denies problem, Genitourinary: Denies problem  Musculoskeletal:  Denies  "problem  Skin: Denies problem  Neurologic: Denies problem  Psychiatric: Denies problem  Endocrine: Denies problem  Heme/Lymphatic: Denies problem   Allergic/Immunologic: Denies problem        Objective:         Vitals:    01/23/17 0941   BP: 100/68   Pulse: 66   SpO2: 98%   Weight: 142 lb 4.8 oz (64.5 kg)   Height: 5' 3\" (1.6 m)     Body mass index is 25.21 kg/(m^2).    Physical Exam:  General Appearance: Alert, cooperative, no distress, appears stated age  Head: Normocephalic, without obvious abnormality, atraumatic  Eyes: PERRL, conjunctiva/corneas clear, EOM's intact  Ears: Normal TM's and external ear canals, both ears  Nose: Nares normal, septum midline,mucosa normal, no drainage  Throat: Lips, mucosa, and tongue normal; teeth and gums normal  Neck: Supple, symmetrical, trachea midline, no adenopathy;  thyroid: not enlarged, symmetric, no tenderness/mass/nodules; no carotid bruit or JVD  Back: Symmetric, no curvature, ROM normal, no CVA tenderness  Lungs: Clear to auscultation bilaterally, respirations unlabored  Breasts: No breast masses, tenderness, asymmetry, or nipple discharge.  Heart: Regular rate and rhythm, S1 and S2 normal, no murmur, rub, or gallop, Abdomen: Soft, non-tender, bowel sounds active all four quadrants,  no masses, no organomegaly  Pelvic:Normally developed genitalia with no external lesions or eruptions. Vagina and cervix show no lesions, inflammation, discharge or tenderness.   Extremities: Extremities normal, atraumatic, no cyanosis or edema  Skin: Skin color, texture, turgor normal, no rashes or lesions  Lymph nodes: Cervical, supraclavicular, and axillary nodes normal  Neurologic: Normal        "

## 2021-06-09 NOTE — TELEPHONE ENCOUNTER
RN Triage:    Spoke with 55 yr old Evangelina who c/o:    On vacation in [facility] and not returning for 2 days.    Bladder infection symptoms began 2 days ago.    Urgency.    Frequency.    voiding in small amounts.    Burning with urination.    Drops of blood in the urine.    Denies fever or flank pain.    Drinking cranberry juice and taking AZO.    Hx of bladder infections, last was 2 years ago.    Pharmacy:    [facility].  [address]  [place], [place]    [phone].    PLAN:  Advised OV today per protocol.  Pt declines. Will consult with PCP or covering MD per patient request for Rx to local pharmacy versus OV.  Please advise.  Care advice given per UTI protocol.  Pt advised to call back if symptoms are worsening.    Shaniqua Driver RN   [facility] RN Triage      Reason for Disposition    Age > 50 years    Additional Information    Negative: Shock suspected (e.g., cold/pale/clammy skin, too weak to stand, low BP, rapid pulse)    Negative: Sounds like a life-threatening emergency to the triager    Negative: Unable to urinate (or only a few drops) and bladder feels very full    Negative: Vomiting    Negative: Patient sounds very sick or weak to the triager    Negative: Severe pain with urination    Negative: Fever > 100.5 F (38.1 C)    Negative: Side (flank) or lower back pain present    Negative: Taking antibiotic > 24 hours for UTI and fever persists    Negative: Taking antibiotic > 3 days for UTI and painful urination not improved    Negative: Unusual vaginal discharge    Negative: > 2 UTIs in last year    Negative: Patient is worried about sexually transmitted disease (STD)    Protocols used: URINATION PAIN - FEMALE-A-OH

## 2021-06-10 NOTE — TELEPHONE ENCOUNTER
RN cannot approve Refill Request    RN can NOT refill this medication PCP messaged that patient is overdue for Office Visit. Last office visit: 4/30/2018 Tiny Ballesteros MD Last Physical: 7/29/2019 Last MTM visit: Visit date not found Last visit same specialty: 4/30/2018 Tiny Ballesteros MD.  Next visit within 3 mo: Visit date not found  Next physical within 3 mo: Visit date not found      Katya Koo, [facility] Triage/Med Refill 8/10/2020    Requested Prescriptions   Pending Prescriptions Disp Refills     FLUoxetine (PROZAC) 10 MG capsule [Pharmacy Med Name: FLUOXETINE  10MG  CAP] 90 capsule 3     Sig: TAKE 1 CAPSULE BY MOUTH  DAILY       [facility] Refill Protocol  Failed - 8/9/2020  4:33 AM        Failed - PCP or prescribing provider visit in last year     Last office visit with prescriber/PCP: 4/30/2018 Tiny Ballesteros MD OR same dept: Visit date not found OR same specialty: 4/30/2018 Tiny Ballesteros MD  Last physical: 7/29/2019 Last MTM visit: Visit date not found   Next visit within 3 mo: Visit date not found  Next physical within 3 mo: Visit date not found  Prescriber OR PCP: Tiny Ballesteros MD  Last diagnosis associated with med order: 1. Generalized anxiety disorder  - FLUoxetine (PROZAC) 10 MG capsule [Pharmacy Med Name: FLUOXETINE  10MG  CAP]; TAKE 1 CAPSULE BY MOUTH  DAILY  Dispense: 90 capsule; Refill: 3    If protocol passes may refill for 12 months if within 3 months of last provider visit (or a total of 15 months).

## 2021-06-11 NOTE — TELEPHONE ENCOUNTER
Left message to call back for: Evangelina  Information to relay to patient:  LM for patient to call back to schedule appointment.  Please see notes below and schedule upon recall.

## 2021-06-11 NOTE — TELEPHONE ENCOUNTER
Left message to call back for: Evangelina  Information to relay to patient:  LM for patient to call back to schedule appointment. Please see notes below and schedule upon recall

## 2021-06-13 NOTE — PROGRESS NOTES
Assessment/Plan:      Healthy female exam.   Problem List Items Addressed This Visit        [facility] Cardiac Problems    Hyperlipidemia     The patient had a CT heart scan last year due to hyperlipidemia and family history for cardiovascular disease.  The CT scan came back with a score of 0.  Continue to monitor.         Relevant Orders    Lipid Miranda FASTING (Completed)    Migraine Headache     Takes Imitrex 50mg PRN            Other    Generalized Anxiety Disorder     Continues on fluoxetine 10 mg daily and Xanax sparingly as needed.           Other Visit Diagnoses     Routine general medical examination at a health care facility    -  Primary               Subjective:      Evangelina Boggs is a 55 y.o. female who presents for an annual exam.  The patient feels well overall without any specific complaints or concerns at today's visit.    Healthy Habits:   Regular Exercise: Yes; but not as much  Sunscreen Use: yes, on chest and face  Healthy Diet: getting better  Dental Visits Regularly: Yes  Seat Belt: Yes  Sexually active: Yes  Colon cancer screenin  Lipid Profile: Yes  Glucose Screen: N/A  Prevention of Osteoporosis: Yes  Last Dexa: N/A      Immunization History   Administered Date(s) Administered     DT (pediatric) 1999     Influenza, Seasonal, Inj PF IIV3 2010     Influenza, seasonal,quad inj 6-35 mos 2012     Td, adult adsorbed, PF 10/04/2018     Td,adult,historic,unspecified 06/15/2006     Tdap 2010     Immunization status: up to date and documented.    Gynecologic History  Patient's last menstrual period was 2020 (approximate).  Contraception: vasectomy  Last Pap: . Results were: normal  Last mammogram: 2017. Results were: normal      OB History    Para Term  AB Living   2 2 2         SAB TAB Ectopic Multiple Live Births                  # Outcome Date GA Lbr Tyler/2nd Weight Sex Delivery Anes PTL Lv   2 Term            1 Term                Current  Outpatient Medications   Medication Sig Dispense Refill     ALPRAZolam (XANAX) 0.5 MG tablet TAKE 1 TABLET BY MOUTH THREE TIMES A DAY AS NEEDED FOR ANXIETY OR SLEEP 20 tablet 0     FLUoxetine (PROZAC) 10 MG capsule TAKE 2 CAPSULES BY MOUTH  DAILY 180 capsule 0     SUMAtriptan (IMITREX) 50 MG tablet TAKE 1 TABLET BY MOUTH  EVERY 2 HOURS AS NEEDED FOR MIGRAINE.   RECOMMENDS NOT TO EXCEED 4  TABLETS IN 24 HOURS 27 tablet 0     No current facility-administered medications for this visit.      Past Medical History:   Diagnosis Date     High cholesterol      Past Surgical History:   Procedure Laterality Date     BREAST BIOPSY Left 2012     MI  DELIVERY ONLY      Description:  Section;  Recorded: 2008;  Comments: two children     MI INCISE FINGER TENDON SHEATH      Description: Hand Incision Tendon Sheath Of A Finger;  Recorded: 2011;     Patient has no known allergies.  Family History   Problem Relation Age of Onset     Atrial fibrillation Mother      Graves' disease Mother      Heart disease Father 70        bypass grafting     Heart disease Paternal Uncle 52        MI and stents     Stroke Maternal Grandmother      Stroke Maternal Grandfather      Heart disease Paternal Grandfather 36        coronary arteriosclerosis     Carotid Endarterectomy Paternal Uncle      Stroke Paternal Uncle      Social History     Socioeconomic History     Marital status:      Spouse name: Not on file     Number of children: Not on file     Years of education: Not on file     Highest education level: Not on file   Occupational History     Not on file   Social Needs     Financial resource strain: Not on file     Food insecurity     Worry: Not on file     Inability: Not on file     Transportation needs     Medical: Not on file     Non-medical: Not on file   Tobacco Use     Smoking status: Never Smoker     Smokeless tobacco: Never Used   Substance and Sexual Activity     Alcohol use: Not on file      "Drug use: Not on file     Sexual activity: Not on file   Lifestyle     Physical activity     Days per week: Not on file     Minutes per session: Not on file     Stress: Not on file   Relationships     Social connections     Talks on phone: Not on file     Gets together: Not on file     Attends Islam service: Not on file     Active member of club or organization: Not on file     Attends meetings of clubs or organizations: Not on file     Relationship status: Not on file     Intimate partner violence     Fear of current or ex partner: Not on file     Emotionally abused: Not on file     Physically abused: Not on file     Forced sexual activity: Not on file   Other Topics Concern     Not on file   Social History Narrative     Not on file       Review of Systems  General:  Denies problem  Eyes: Denies problem  Ears/Nose/Throat: Denies problem  Cardiovascular: Denies problem  Respiratory:  Denies problem  Gastrointestinal:  Denies problem, Genitourinary: Denies problem  Musculoskeletal:  Denies problem  Skin: Denies problem  Neurologic: Denies problem  Psychiatric: Denies problem  Endocrine: Denies problem  Heme/Lymphatic: Denies problem   Allergic/Immunologic: Denies problem        Objective:         Vitals:    11/30/20 1504   BP: 100/64   Pulse: 79   SpO2: 99%   Weight: 147 lb (66.7 kg)   Height: 5' 2.75\" (1.594 m)     Body mass index is 26.25 kg/m .    Physical Exam:  General Appearance: Alert, cooperative, no distress, appears stated age  Head: Normocephalic, without obvious abnormality, atraumatic  Eyes: PERRL, conjunctiva/corneas clear, EOM's intact  Ears: Normal TM's and external ear canals, both ears  Nose: Nares normal, septum midline,mucosa normal, no drainage  Throat: Lips, mucosa, and tongue normal; teeth and gums normal  Neck: Supple, symmetrical, trachea midline, no adenopathy;  thyroid: not enlarged, symmetric, no tenderness/mass/nodules; no carotid bruit or JVD  Back: Symmetric, no curvature, ROM " normal, no CVA tenderness  Lungs: Clear to auscultation bilaterally, respirations unlabored  Breasts: No breast masses, tenderness, asymmetry, or nipple discharge.  Heart: Regular rate and rhythm, S1 and S2 normal, no murmur, rub, or gallop, Abdomen: Soft, non-tender, bowel sounds active all four quadrants,  no masses, no organomegaly  Pelvic:Not examined  Extremities: Extremities normal, atraumatic, no cyanosis or edema  Skin: Skin color, texture, turgor normal, no rashes or lesions  Lymph nodes: Cervical, supraclavicular, and axillary nodes normal  Neurologic: Normal

## 2021-06-13 NOTE — TELEPHONE ENCOUNTER
RN cannot approve Refill Request    RN can NOT refill this medication Protocol failed and NO refill given. Last office visit: 4/30/2018 Tiny Ballesteros MD Last Physical: 7/29/2019 Last MTM visit: Visit date not found Last visit same specialty: 4/30/2018 Tiny Ballesteros MD.  Next visit within 3 mo: Visit date not found  Next physical within 3 mo: Visit date not found      Winifred Benites, [facility] Connection Triage/Med Refill 11/30/2020    Requested Prescriptions   Pending Prescriptions Disp Refills     FLUoxetine (PROZAC) 10 MG capsule [Pharmacy Med Name: FLUOXETINE  10MG  CAP] 180 capsule 3     Sig: TAKE 2 CAPSULES BY MOUTH  DAILY       [facility] Refill Protocol  Failed - 11/30/2020  5:46 AM        Failed - PCP or prescribing provider visit in last year     Last office visit with prescriber/PCP: 4/30/2018 Tiny Ballesteros MD OR same dept: Visit date not found OR same specialty: 4/30/2018 Tiny Ballesteros MD  Last physical: 7/29/2019 Last MTM visit: Visit date not found   Next visit within 3 mo: Visit date not found  Next physical within 3 mo: Visit date not found  Prescriber OR PCP: Tiny Ballesteros MD  Last diagnosis associated with med order: 1. Generalized anxiety disorder  - FLUoxetine (PROZAC) 10 MG capsule [Pharmacy Med Name: FLUOXETINE  10MG  CAP]; TAKE 2 CAPSULES BY MOUTH  DAILY  Dispense: 180 capsule; Refill: 3    If protocol passes may refill for 12 months if within 3 months of last provider visit (or a total of 15 months).

## 2021-06-15 PROBLEM — F40.240 CLAUSTROPHOBIA: Status: ACTIVE | Noted: 2017-01-23

## 2021-06-15 NOTE — TELEPHONE ENCOUNTER
Refill Approved    Rx renewed per Medication Renewal Policy. Medication was last renewed on 11/30/2020.  Last office visit: 11/30/2020 Physical with PCP Dr NKECHI Ballesteros.     Shruthi Wong, [facility] Triage/Med Refill 2/3/2021     Requested Prescriptions   Pending Prescriptions Disp Refills     FLUoxetine (PROZAC) 10 MG capsule [Pharmacy Med Name: FLUOXETINE  10MG  CAP] 180 capsule 3     Sig: TAKE 2 CAPSULES BY MOUTH  DAILY       [facility] Refill Protocol  Passed - 2/3/2021 10:00 PM        Passed - PCP or prescribing provider visit in last year     Last office visit with prescriber/PCP: 4/30/2018 Tiny Ballesteros MD OR same dept: Visit date not found OR same specialty: 4/30/2018 Tiny Ballesteros MD  Last physical: 11/30/2020 Last MTM visit: Visit date not found   Next visit within 3 mo: Visit date not found  Next physical within 3 mo: Visit date not found  Prescriber OR PCP: Tiny Ballesteros MD  Last diagnosis associated with med order: 1. Generalized anxiety disorder  - FLUoxetine (PROZAC) 10 MG capsule [Pharmacy Med Name: FLUOXETINE  10MG  CAP]; TAKE 2 CAPSULES BY MOUTH  DAILY  Dispense: 180 capsule; Refill: 3    If protocol passes may refill for 12 months if within 3 months of last provider visit (or a total of 15 months).

## 2021-06-16 NOTE — PROGRESS NOTES
Assessment:     1. Severe episode of recurrent major depressive disorder, without psychotic features     2. Visit for screening mammogram  Mammo Screening Bilateral   3. Weight loss  Thyroid Cascade   4. Generalized anxiety disorder         Plan:     Evangelina is a 52-year-old female presenting today with symptoms of both depression and anxiety.  The patient did well in the past with fluoxetine and I think that is a reasonable place to start and a prescription for the 20 mg dose was provided.  Because of how severe her anxiety symptoms are I think it is also reasonable to treat short-term initially with increased benzodiazepine.  A prescription for Xanax was provided 0.25 mg dose to take up to 3 times daily as needed.  I asked patient to follow-up in 2 weeks for close follow-up to see if she is responding.  I reiterated how important I think it is also that she reconnect with her psychotherapist in [place] and she agreed to do so.    Subjective:       52 y.o. female presents for evaluation of symptoms of severe anxiety as well as depression.  The patient relates these symptoms to a significant stressor in her life.  She and her  decided to purchase a business opportunity with [employer] and at this time finances are not going well.  The patient feels a great deal of anxiety and has significant symptoms of low mood and depression related to that stress.  She has a history of depression as well as anxiety in the past although has been off medication for several years.  She responded well in the past she remembers to a combination of fluoxetine and Wellbutrin.  She was on Paxil at one point but did not like how difficult it was to get off that medication.  The patient has a history of claustrophobia as well for which she was prescribed lorazepam at her physical year ago.  She has taken that medication sparingly for claustrophobia and has found quite a bit of effectiveness.  She has used a little bit of it recently as  "well to treat her anxiety but notes that it seems to make her feel slightly more depressed and takes some of her energy away.  The patient does report she has some very dark thoughts but none of those thoughts relate to actually harming herself or taking her own life.  The patient has a psychotherapist she has seen in the past in [facility] and would be willing to reconnect with her at this time.    The following portions of the patient's history were reviewed and updated as appropriate: allergies, current medications, past family history, past medical history, past social history, past surgical history and problem list.    Review of Systems  Pertinent items are noted in HPI.     History   Smoking Status     Never Smoker   Smokeless Tobacco     Never Used         Objective:      /60 (Patient Site: Left Arm, Patient Position: Sitting, Cuff Size: Adult Regular)  Pulse 62  Ht 5' 3\" (1.6 m)  Wt 123 lb 14.4 oz (56.2 kg)  Breastfeeding? No  BMI 21.95 kg/m2  General appearance: alert, appears stated age, cooperative and tearful       This note has been dictated using voice recognition software. Any grammatical or context distortions are unintentional and inherent to the software  "

## 2021-06-17 NOTE — PROGRESS NOTES
Assessment:     1. Generalized anxiety disorder     2. Severe episode of recurrent major depressive disorder, without psychotic features         Plan:     Evangelina is a 52-year-old female presenting today regarding mood symptoms.  Continue on fluoxetine 30 mg daily and a prescription was provided.  We discussed the Xanax and gradually reducing that medication with follow-up again in 2 weeks to reassess.  The patient was covered with that plan.    Subjective:       52 y.o. female presents for evaluation of generalized anxiety disorder as well as depression.  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  I increased her fluoxetine to 30 mg daily when I saw her last and the patient reports that she is tolerating that change well and does feel that she is finally getting much better.  She denies any symptoms of depression at this point and her anxiety is under better control but still an issue.  The patient finds it about 5 times a week she is needs to take a half of her Xanax tablet and that there are times when an order for her to relax late at night because of severe stress and anxiety she will take a full tablet of Xanax.  However, this is much better than it was even a couple of weeks ago.  The patient is very determined to gradually get off this medication but is not ready to do so yet.    The following portions of the patient's history were reviewed and updated as appropriate: allergies, current medications, past family history, past medical history, past social history, past surgical history and problem list.    Review of Systems  Pertinent items are noted in HPI.     History   Smoking Status     Never Smoker   Smokeless Tobacco     Never Used         Objective:      /74 (Patient Site: Left Arm, Patient  Position: Sitting, Cuff Size: Adult Regular)  Wt 118 lb (53.5 kg)  Breastfeeding? No  BMI 20.9 kg/m2  General appearance: alert, appears stated age and cooperative       This note has been dictated using voice recognition software. Any grammatical or context distortions are unintentional and inherent to the software

## 2021-06-17 NOTE — PROGRESS NOTES
Assessment:     1. Severe episode of recurrent major depressive disorder, without psychotic features     2. Generalized anxiety disorder         Plan:     Evangelina is a 52-year-old female presenting today with symptoms of anxiety as well as depression.  The depression does seem to be responding to fluoxetine 20 mill grams daily but she has not seen any improvement in her anxiety yet.  She is tolerating the medication well so I increased her fluoxetine to 30 mg daily.  I advised increasing her Xanax to 1 tablet up to 3 times daily as needed but using this sparingly with close follow-up in 2 weeks.  Once the fluoxetine starts to provide an effective relief of her anxiety symptoms we will very intentionally start to wean off and discontinue the Xanax.    Subjective:       52 y.o. female presents for a follow-up regarding severe anxiety and depression.  The patient has been going through a very stressful time in her life and has had significant anxiety with panic.  The patient presented a couple of weeks ago with a significantly depressed mood as well with substantial weight loss because of lack of appetite from all of this.  She was started on fluoxetine 20 mg daily and comes in today stating that she does feel that the medication has been helpful.  Specifically, she feels it has helped lift her mood a bit but it has not seemed to help as much with the anxiety component.  She continues to have high anxiety as well as panic symptoms.  She wakes up in the morning feeling intensely agitated.  She was prescribed Xanax and advised to take 0.25 mg or half a tablet up to 3 times daily as needed.  The patient states that the half a tablet is minimally effective.  She has taken one full tablet at night and it has helped her rest.  The patient expresses that she is concerned about becoming dependent on that medication though.     The following portions of the patient's history were reviewed and updated as appropriate: allergies,  current medications, past family history, past medical history, past social history, past surgical history and problem list.    Review of Systems  Pertinent items are noted in HPI.     History   Smoking Status     Never Smoker   Smokeless Tobacco     Never Used         Objective:      /74 (Patient Site: Left Arm, Patient Position: Sitting, Cuff Size: Adult Regular)  Pulse 76  Wt 118 lb (53.5 kg)  BMI 20.9 kg/m2  General appearance: alert, appears stated age, cooperative and anxious       This note has been dictated using voice recognition software. Any grammatical or context distortions are unintentional and inherent to the software

## 2021-06-17 NOTE — PROGRESS NOTES
Assessment:     1. Generalized anxiety disorder     2. Severe episode of recurrent major depressive disorder, without psychotic features         Plan:     Devika is a 52-year-old very pleasant female presenting today for follow-up regarding severe anxiety.  I advised continuing on fluoxetine 20 mg daily and alprazolam 1 tablet daily with the plan to start reducing to a half a tablet daily and then eventually as needed hopefully over the next month.  I asked the patient to follow-up in 6 weeks to reevaluate how she is doing.  If she does need a refill on her alprazolam between now and then I would be happy to provide that it communicated that with the patient today.    Subjective:       52 y.o. female presents for a follow-up regarding mood and anxiety.  The patient continues to do better from a mood standpoint but struggles with ongoing rather significant anxiety symptoms.  She does feel that she has improved and is responding to fluoxetine but needed to reduce her dose from 30 mg to 20 mg that she was feeling a flattened affect and like it was not necessarily helping more.  She is continuing to take her alprazolam sparingly and has plenty left.  She currently reports that she find she needs to take it on a once daily basis.  She is struggling to sleep more at night and wonders if it is a side effect of the fluoxetine.  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    The following portions of the patient's history were reviewed and updated as appropriate: allergies, current medications, past family history, past medical history, past social history, past surgical history and problem list.    Review of Systems  Pertinent items are noted in HPI.     History   Smoking Status     Never Smoker   Smokeless Tobacco     Never Used         Objective:      /74 (Patient Site: Left Arm, Patient  "Position: Sitting, Cuff Size: Adult Regular)  Ht 5' 3\" (1.6 m)  Wt 114 lb (51.7 kg)  BMI 20.19 kg/m2  General appearance: alert, appears stated age and cooperative       This note has been dictated using voice recognition software. Any grammatical or context distortions are unintentional and inherent to the software  "

## 2021-06-23 NOTE — TELEPHONE ENCOUNTER
Controlled Substance Refill Request  Medication:   Requested Prescriptions     Pending Prescriptions Disp Refills     ALPRAZolam (XANAX) 0.5 MG tablet [Pharmacy Med Name: ALPRAZOLAM 0.5 MG TABLET] 20 tablet      Sig: TAKE 1 TABLET BY MOUTH THREE TIMES A DAY AS NEEDED FOR ANXIETY OR SLEEP     Date Last Fill: 1/25/19  [facility]   Submit electronically to pharmacy    Controlled Substance Agreement on File:   Encounter-Level CSA Scan Date:    There are no encounter-level csa scan date.       Last office visit with primary: 4/30/2018

## 2021-06-23 NOTE — TELEPHONE ENCOUNTER
database for [place] checked. No record available. Suspect dysfunction of [place] system as that is where her prescriptions are sent.    Reviewed most recent note from PCP addressing this issue, 4/30/2018. In that note the plan was to start decreasing alprazolam to 1/2 tab daily and then as needed. Rx since then:  5/7/2018: 20 tabs  12/3/2018: 20 tabs    Request consistent with previously stated plan by primary care provider.  Refill for 10 tabs sent to the pharmacy.

## 2021-06-23 NOTE — TELEPHONE ENCOUNTER
Controlled Substance Refill Request  Medication:   Requested Prescriptions     Pending Prescriptions Disp Refills     ALPRAZolam (XANAX) 0.5 MG tablet [Pharmacy Med Name: ALPRAZOLAM 0.5 MG TABLET] 20 tablet 0     Sig: TAKE 1 TABLET BY MOUTH THREE TIMES A DAY AS NEEDED FOR ANXIETY OR SLEEP     Date Last Fill: 12/3/18  [id number]   Submit electronically to pharmacy  Controlled Substance Agreement on File:   Encounter-Level CSA Scan Date:    There are no encounter-level csa scan date.       Last office visit: Last office visit pertaining to requested medication was 4/30/18.

## 2021-06-25 NOTE — TELEPHONE ENCOUNTER
Requested Prescriptions     Pending Prescriptions Disp Refills     SUMAtriptan (IMITREX) 50 MG tablet 27 tablet 0     Sig: TAKE 1 TABLET BY MOUTH  EVERY 2 HOURS AS NEEDED FOR MIGRAINE.   RECOMMENDS NOT TO EXCEED 4  TABLETS IN 24 HOURS

## 2021-07-03 NOTE — ADDENDUM NOTE
Addendum Note by Tiny Ballesteros MD at 2/27/2017 10:14 AM     Author: Tiny Ballesteros MD Service: -- Author Type: Physician    Filed: 2/27/2017 10:14 AM Encounter Date: 1/23/2017 Status: Signed    : Tiny Ballesteros MD (Physician)    Addended by: TINY BALLESTEROS on: 2/27/2017 10:14 AM        Modules accepted: Orders

## 2021-07-13 ENCOUNTER — RECORDS - HEALTHEAST (OUTPATIENT)
Dept: ADMINISTRATIVE | Facility: CLINIC | Age: 56
End: 2021-07-13

## 2021-07-21 ENCOUNTER — RECORDS - HEALTHEAST (OUTPATIENT)
Dept: ADMINISTRATIVE | Facility: CLINIC | Age: 56
End: 2021-07-21

## 2021-12-20 ENCOUNTER — OFFICE VISIT (OUTPATIENT)
Dept: FAMILY MEDICINE | Facility: CLINIC | Age: 56
End: 2021-12-20
Payer: COMMERCIAL

## 2021-12-20 VITALS
WEIGHT: 136 LBS | BODY MASS INDEX: 24.28 KG/M2 | OXYGEN SATURATION: 99 % | SYSTOLIC BLOOD PRESSURE: 110 MMHG | HEART RATE: 80 BPM | DIASTOLIC BLOOD PRESSURE: 72 MMHG

## 2021-12-20 DIAGNOSIS — F41.1 GENERALIZED ANXIETY DISORDER: ICD-10-CM

## 2021-12-20 DIAGNOSIS — R19.7 DIARRHEA, UNSPECIFIED TYPE: ICD-10-CM

## 2021-12-20 DIAGNOSIS — F40.240 CLAUSTROPHOBIA: ICD-10-CM

## 2021-12-20 DIAGNOSIS — G43.909 MIGRAINE HEADACHE: ICD-10-CM

## 2021-12-20 DIAGNOSIS — R16.0 LIVER MASS: Primary | ICD-10-CM

## 2021-12-20 PROCEDURE — 99214 OFFICE O/P EST MOD 30 MIN: CPT | Performed by: FAMILY MEDICINE

## 2021-12-20 RX ORDER — DIAZEPAM 5 MG
TABLET ORAL
Qty: 2 TABLET | Refills: 0 | Status: SHIPPED | OUTPATIENT
Start: 2021-12-20

## 2021-12-20 RX ORDER — FLUOXETINE 10 MG/1
10 CAPSULE ORAL 2 TIMES DAILY
Qty: 180 CAPSULE | Refills: 3 | Status: SHIPPED | OUTPATIENT
Start: 2021-12-20

## 2021-12-20 RX ORDER — SUMATRIPTAN 50 MG/1
TABLET, FILM COATED ORAL
Qty: 27 TABLET | Refills: 2 | Status: SHIPPED | OUTPATIENT
Start: 2021-12-20

## 2021-12-20 NOTE — PROGRESS NOTES
Problem List Items Addressed This Visit        Other    Claustrophobia    Relevant Medications    diazepam (VALIUM) 5 MG tablet      Other Visit Diagnoses     Liver mass    -  Primary    Relevant Orders    MR Liver wo & w Contrast    Diarrhea, unspecified type        Relevant Orders    Fat Stool Qual Random Collection    Adult Gastro Ref - Consult Only        I reviewed the patient's discharge summary from her hospitalization in [place].  I ordered an MRI to follow-up on the 10 mm liver lesion and some Valium as she does have a history of significant claustrophobia.  I answered her questions as it relates to the findings during her hospitalization.  I ordered a fecal fat collection and placed a referral for consultation with gastroenterology for further assessment of her concern about possible malabsorption.    JENNIFER Hutchinson is a 56 year old who presents for a hospital follow-up visit.  The patient was hospitalized at [facility] initially presenting with a chief complaint of abdominal pain and diarrhea.  The patient had tested positive for Covid at the very end of November.  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  Ultimately, a CT angiogram was performed which showed good patency and it was concluded that the initial CT scan was an erroneous finding.  She also did have a small lesion found on her liver that needs follow-up.  The patient also mentions that she has more chronic issues with what seems like possible malabsorption.  She states that she gets diarrhea frequently and that it seems oily or greasy.  She also had some extensive lab work done earlier this year with several vitamin deficiencies and in particular those vitamins that are fat-soluble were affected.  She is wondering how to go forward to get further evaluation  regarding this.     Objective    /72 (BP Location: Left arm, Patient Position: Left side, Cuff Size: Adult Regular)   Pulse 80   Wt 61.7 kg (136 lb)   SpO2 99%   BMI 24.28 kg/m    Body mass index is 24.28 kg/m .  Physical Exam   GENERAL: healthy, alert and no distress

## 2021-12-28 ENCOUNTER — HOSPITAL ENCOUNTER (OUTPATIENT)
Dept: MRI IMAGING | Facility: HOSPITAL | Age: 56
Discharge: HOME | End: 2021-12-28
Attending: FAMILY MEDICINE | Admitting: FAMILY MEDICINE
Payer: COMMERCIAL

## 2021-12-28 DIAGNOSIS — R16.0 LIVER MASS: ICD-10-CM

## 2021-12-28 PROCEDURE — A9585 GADOBUTROL INJECTION: HCPCS | Performed by: FAMILY MEDICINE

## 2021-12-28 PROCEDURE — 255N000002 HC RX 255 OP 636: Performed by: FAMILY MEDICINE

## 2021-12-28 PROCEDURE — 74183 MRI ABD W/O CNTR FLWD CNTR: CPT

## 2021-12-28 RX ORDER — GADOBUTROL 604.72 MG/ML
6 INJECTION INTRAVENOUS ONCE
Status: COMPLETED | OUTPATIENT
Start: 2021-12-28 | End: 2021-12-28

## 2021-12-28 RX ADMIN — GADOBUTROL 6 ML: 604.72 INJECTION INTRAVENOUS at 09:28

## 2022-01-30 ENCOUNTER — HEALTH MAINTENANCE LETTER (OUTPATIENT)
Age: 57
End: 2022-01-30

## 2023-05-08 ENCOUNTER — HEALTH MAINTENANCE LETTER (OUTPATIENT)
Age: 58
End: 2023-05-08

## 2024-07-14 ENCOUNTER — HEALTH MAINTENANCE LETTER (OUTPATIENT)
Age: 59
End: 2024-07-14